# Patient Record
(demographics unavailable — no encounter records)

---

## 2024-10-10 NOTE — HISTORY OF PRESENT ILLNESS
[None] : The patient complains of no symptoms [de-identified] : 75 Y.O Male w/ a PMHx of HTN, HLD, bladder CA (dx in 2016) s/p surgical resection and possible radiation therapy in [facility] currently no tx, syncopal episode s/p ILR  During hospital visit 12/2022 one tele episode of aflutter for 20sec. ILR implanted for long term detection of AF/AFL and burden.  Patient came to the hospital in December 2022 with loss of consciousness and fainting. EP was consulted for 21 seconds of atrial flutter. Patient was implanted with loop recorder for further observation. There was a question if a possible conversion pause caused patient's syncope. Anticoagulation was not recommended at that time due to short duration of atrial flutter, however loop recorder was implanted to determine atrial flutter burden or arrhythmic cause of syncope.   Patient comes to the office today for follow up. He is feeling well and has no cardiac complaints. He is walking 7-8 miles around 4 times weekly. He was recently diagnosed with bladder CA recurrence. No syncope.

## 2024-10-10 NOTE — PROCEDURE
[NSR] : normal sinus rhythm [See Scanned Paceart Report] : See scanned paceart report [See Device Printout] : See device printout [Normal] : The battery status is normal. [Sensing Amplitude ___mv] : sensing amplitude was [unfilled] mv [Programmed for Longevity] : output reprogrammed for improved battery longevity [de-identified] : KIP [de-identified] : LINQ II [de-identified] : [id number] [de-identified] : 12/30/2022 [de-identified] : 75 bpm [de-identified] : False tachy episodes filtered out on [facility] On remote

## 2024-10-10 NOTE — PHYSICAL EXAM
[General Appearance - Well Developed] : well developed [Normal Appearance] : normal appearance [Well Groomed] : well groomed [General Appearance - Well Nourished] : well nourished [No Deformities] : no deformities [General Appearance - In No Acute Distress] : no acute distress [Heart Rate And Rhythm] : heart rate and rhythm were normal [Heart Sounds] : normal S1 and S2 [] : no respiratory distress [Exaggerated Use Of Accessory Muscles For Inspiration] : no accessory muscle use [Respiration, Rhythm And Depth] : normal respiratory rhythm and effort [Clean] : clean [Dry] : dry [Well-Healed] : well-healed [Abdomen Soft] : soft [Abdomen Tenderness] : non-tender [Nail Clubbing] : no clubbing of the fingernails [Cyanosis, Localized] : no localized cyanosis

## 2024-10-10 NOTE — ASSESSMENT
[FreeTextEntry1] : # Syncope s/p ILR implant (MDT, 12/30/2022) - Incision site well healed. Pt has no pain. - Device function normal. No events. False tachy episodes due to oversensing. See Device Printout - Unknown etiology of syncope. - Syncope could be related to bradyarrhythmia's or conversion pauses from atrial flutter possibly. - Will continue to monitor through loop recorder AF burden. - He is on remote and transmitting.   # HTN - BP elevated. No symptoms - SBP usually 130s when he checks at home. - BP log encouraged - low sodium diet enforced  - RTO in 9-12 months